# Patient Record
Sex: FEMALE | Race: WHITE | Employment: OTHER
[De-identification: names, ages, dates, MRNs, and addresses within clinical notes are randomized per-mention and may not be internally consistent; named-entity substitution may affect disease eponyms.]

---

## 2024-09-24 ENCOUNTER — OFFICE VISIT (OUTPATIENT)
Dept: PRIMARY CARE CLINIC | Facility: CLINIC | Age: 74
End: 2024-09-24
Payer: MEDICARE

## 2024-09-24 VITALS
HEART RATE: 76 BPM | BODY MASS INDEX: 20.66 KG/M2 | DIASTOLIC BLOOD PRESSURE: 84 MMHG | WEIGHT: 121 LBS | HEIGHT: 64 IN | SYSTOLIC BLOOD PRESSURE: 118 MMHG | TEMPERATURE: 97.8 F | OXYGEN SATURATION: 98 %

## 2024-09-24 DIAGNOSIS — Z76.89 ENCOUNTER TO ESTABLISH CARE WITH NEW DOCTOR: Primary | ICD-10-CM

## 2024-09-24 DIAGNOSIS — E78.89 ELEVATED HDL: ICD-10-CM

## 2024-09-24 DIAGNOSIS — Z12.31 SCREENING MAMMOGRAM FOR BREAST CANCER: ICD-10-CM

## 2024-09-24 DIAGNOSIS — M85.851 OSTEOPENIA OF NECKS OF BOTH FEMURS: ICD-10-CM

## 2024-09-24 DIAGNOSIS — M85.852 OSTEOPENIA OF NECKS OF BOTH FEMURS: ICD-10-CM

## 2024-09-24 PROCEDURE — 1036F TOBACCO NON-USER: CPT | Performed by: FAMILY MEDICINE

## 2024-09-24 PROCEDURE — G8420 CALC BMI NORM PARAMETERS: HCPCS | Performed by: FAMILY MEDICINE

## 2024-09-24 PROCEDURE — 1090F PRES/ABSN URINE INCON ASSESS: CPT | Performed by: FAMILY MEDICINE

## 2024-09-24 PROCEDURE — G8427 DOCREV CUR MEDS BY ELIG CLIN: HCPCS | Performed by: FAMILY MEDICINE

## 2024-09-24 PROCEDURE — 99203 OFFICE O/P NEW LOW 30 MIN: CPT | Performed by: FAMILY MEDICINE

## 2024-09-24 PROCEDURE — 3017F COLORECTAL CA SCREEN DOC REV: CPT | Performed by: FAMILY MEDICINE

## 2024-09-24 PROCEDURE — G8400 PT W/DXA NO RESULTS DOC: HCPCS | Performed by: FAMILY MEDICINE

## 2024-09-24 PROCEDURE — 1123F ACP DISCUSS/DSCN MKR DOCD: CPT | Performed by: FAMILY MEDICINE

## 2024-09-24 SDOH — ECONOMIC STABILITY: FOOD INSECURITY: WITHIN THE PAST 12 MONTHS, THE FOOD YOU BOUGHT JUST DIDN'T LAST AND YOU DIDN'T HAVE MONEY TO GET MORE.: NEVER TRUE

## 2024-09-24 SDOH — ECONOMIC STABILITY: INCOME INSECURITY: HOW HARD IS IT FOR YOU TO PAY FOR THE VERY BASICS LIKE FOOD, HOUSING, MEDICAL CARE, AND HEATING?: NOT HARD AT ALL

## 2024-09-24 SDOH — ECONOMIC STABILITY: FOOD INSECURITY: WITHIN THE PAST 12 MONTHS, YOU WORRIED THAT YOUR FOOD WOULD RUN OUT BEFORE YOU GOT MONEY TO BUY MORE.: NEVER TRUE

## 2024-09-24 ASSESSMENT — PATIENT HEALTH QUESTIONNAIRE - PHQ9
2. FEELING DOWN, DEPRESSED OR HOPELESS: NOT AT ALL
SUM OF ALL RESPONSES TO PHQ QUESTIONS 1-9: 0
SUM OF ALL RESPONSES TO PHQ9 QUESTIONS 1 & 2: 0
SUM OF ALL RESPONSES TO PHQ QUESTIONS 1-9: 0
1. LITTLE INTEREST OR PLEASURE IN DOING THINGS: NOT AT ALL

## 2024-09-24 ASSESSMENT — ENCOUNTER SYMPTOMS
VOMITING: 0
DIARRHEA: 0
ABDOMINAL PAIN: 0
SHORTNESS OF BREATH: 0
NAUSEA: 0
COUGH: 0

## 2024-10-01 ENCOUNTER — HOSPITAL ENCOUNTER (OUTPATIENT)
Dept: MAMMOGRAPHY | Age: 74
Discharge: HOME | End: 2024-10-04
Attending: FAMILY MEDICINE
Payer: MEDICARE

## 2024-10-01 DIAGNOSIS — Z12.31 SCREENING MAMMOGRAM FOR BREAST CANCER: ICD-10-CM

## 2024-10-01 PROCEDURE — 77067 SCR MAMMO BI INCL CAD: CPT

## 2024-10-15 ENCOUNTER — TELEPHONE (OUTPATIENT)
Dept: PRIMARY CARE CLINIC | Facility: CLINIC | Age: 74
End: 2024-10-15

## 2024-10-15 NOTE — TELEPHONE ENCOUNTER
Patient called to make acute appointment due to ongoing reaction from 9/24 appointment vaccines and additional vaccines of COVID and RSV.

## 2024-10-16 ENCOUNTER — OFFICE VISIT (OUTPATIENT)
Dept: PRIMARY CARE CLINIC | Facility: CLINIC | Age: 74
End: 2024-10-16

## 2024-10-16 VITALS
BODY MASS INDEX: 20.35 KG/M2 | DIASTOLIC BLOOD PRESSURE: 80 MMHG | HEART RATE: 70 BPM | WEIGHT: 119.2 LBS | TEMPERATURE: 97.5 F | HEIGHT: 64 IN | SYSTOLIC BLOOD PRESSURE: 118 MMHG | OXYGEN SATURATION: 90 %

## 2024-10-16 DIAGNOSIS — J20.9 ACUTE BRONCHITIS, UNSPECIFIED ORGANISM: Primary | ICD-10-CM

## 2024-10-16 LAB
EXP DATE SOLUTION: NORMAL
EXP DATE SWAB: NORMAL
EXPIRATION DATE: NORMAL
LOT NUMBER POC: NORMAL
LOT NUMBER SOLUTION: NORMAL
LOT NUMBER SWAB: NORMAL
QUICKVUE INFLUENZA TEST: NORMAL
RSV RNA, POC: NEGATIVE
SARS-COV-2 RNA, POC: NEGATIVE
VALID INTERNAL CONTROL, POC: NORMAL
VALID INTERNAL CONTROL, POC: YES

## 2024-10-16 RX ORDER — BENZONATATE 200 MG/1
200 CAPSULE ORAL 3 TIMES DAILY PRN
Qty: 30 CAPSULE | Refills: 0 | Status: SHIPPED | OUTPATIENT
Start: 2024-10-16 | End: 2024-10-23

## 2024-10-16 RX ORDER — AZITHROMYCIN 250 MG/1
TABLET, FILM COATED ORAL
Qty: 6 TABLET | Refills: 0 | Status: SHIPPED | OUTPATIENT
Start: 2024-10-16 | End: 2024-10-26

## 2024-10-16 ASSESSMENT — ENCOUNTER SYMPTOMS
NAUSEA: 0
RHINORRHEA: 1
WHEEZING: 1
ABDOMINAL PAIN: 0
VOMITING: 0
SHORTNESS OF BREATH: 0
COUGH: 1
DIARRHEA: 0

## 2024-10-16 NOTE — PROGRESS NOTES
[facility]  Viridiana Barbosa, DO  [address], [address]  [place] [id number] [phone]         ASSESSMENT AND PLAN    Problem List Items Addressed This Visit          Respiratory    Acute bronchitis - Primary      New issue for the last three weeks after getting Flu/COVID/RSV vaccines.  Negative tests in the office.  Will have her stop Prednisone from urgent care due to feeling jittery, will continue Mucinex, Flonase and Benzonatate, and will add Azithromycin.  Encouraged rest and staying hydrated.         Relevant Orders    AMB POC RAPID INFLUENZA TEST (Completed)    AMB POC COVID-19 COV (Completed)    AMB POC RSV (Completed)        The diagnoses and plan were discussed with the patient, who verbalizes understanding and agrees with plan.  All questions answered.    Chief Complaint    Chief Complaint   Patient presents with    Allergic Reaction     States that she was here 2 weeks ago. Got 3 covid shots and RSV vaccine Wednesday 10/2/24. States that she got sick on Thursday.     Cough     States that its productive with green phlegm.        HISTORY OF PRESENT ILLNESS    74 y.o. female presents today for cough and congestion.  Last seen three weeks ago to establish care.  After that visit she went to the pharmacy the next day and got COVID, flu and RSV vaccines.  States that 24 hours later she developed cough and congestion.  Has had persistent symptoms for the last 3 weeks.  States that she went to urgent care and they prescribed her Prednisone and Benzonatate.  Reports that she had fever, reports normal temperature is 96.6 and her temperature went up to 98 degrees.  Did not do any swabs and wrote her prescriptions.  States that she took the prednisone which made her \"wiggy\" so didn't take any more after the first dose.  Using Flonase, Mucinex and Benzonatate.  States that now she is coughing up slimy yellow mucus.  Denies vomiting or diarrhea.  Has had some

## 2024-10-16 NOTE — ASSESSMENT & PLAN NOTE
New issue for the last three weeks after getting Flu/COVID/RSV vaccines.  Negative tests in the office.  Will have her stop Prednisone from urgent care due to feeling jittery, will continue Mucinex, Flonase and Benzonatate, and will add Azithromycin.  Encouraged rest and staying hydrated.

## 2024-10-16 NOTE — PATIENT INSTRUCTIONS
IT WAS GREAT TO SEE YOU TODAY!    I WILL CONTACT YOU WITH THE RESULTS OF YOUR LABS.    PLEASE TAKE ALL MEDICATION AS DISCUSSED.    ~TAKE THE BENZONATATE TO HELP WITH COUGH.    ~CONTINUE THE FLONASE NOSE SPRAY, REMEMBER TO AIM BACKWARDS IN YOUR NOSTRIL.    ~CONTINUE THE MUCUS PILLS.    ~DRINK LOTS OF WATER.    ~TAKE THE AZITHROMYCIN ONCE A DAY WITH FOOD FOR FIVE DAYS.    I WILL SEE YOU AGAIN IN 5 MONTHS BUT PLEASE CALL WITH CONCERNS [phone]

## 2024-10-29 ENCOUNTER — OFFICE VISIT (OUTPATIENT)
Dept: PRIMARY CARE CLINIC | Facility: CLINIC | Age: 74
End: 2024-10-29

## 2024-10-29 VITALS
HEIGHT: 64 IN | BODY MASS INDEX: 20.11 KG/M2 | DIASTOLIC BLOOD PRESSURE: 84 MMHG | OXYGEN SATURATION: 98 % | HEART RATE: 81 BPM | WEIGHT: 117.8 LBS | SYSTOLIC BLOOD PRESSURE: 120 MMHG

## 2024-10-29 DIAGNOSIS — G93.31 POSTVIRAL FATIGUE SYNDROME: ICD-10-CM

## 2024-10-29 DIAGNOSIS — R63.4 WEIGHT LOSS, UNINTENTIONAL: ICD-10-CM

## 2024-10-29 DIAGNOSIS — J20.8 ACUTE BRONCHITIS DUE TO OTHER SPECIFIED ORGANISMS: Primary | ICD-10-CM

## 2024-10-29 LAB
ALBUMIN SERPL-MCNC: 3.8 G/DL (ref 3.2–4.6)
ALBUMIN/GLOB SERPL: 1.4 (ref 1–1.9)
ALP SERPL-CCNC: 93 U/L (ref 35–104)
ALT SERPL-CCNC: 17 U/L (ref 8–45)
ANION GAP SERPL CALC-SCNC: 12 MMOL/L (ref 7–16)
AST SERPL-CCNC: 21 U/L (ref 15–37)
BASOPHILS # BLD: 0.1 K/UL (ref 0–0.2)
BASOPHILS NFR BLD: 1 % (ref 0–2)
BILIRUB SERPL-MCNC: 0.5 MG/DL (ref 0–1.2)
BUN SERPL-MCNC: 17 MG/DL (ref 8–23)
CALCIUM SERPL-MCNC: 9.4 MG/DL (ref 8.8–10.2)
CHLORIDE SERPL-SCNC: 102 MMOL/L (ref 98–107)
CO2 SERPL-SCNC: 25 MMOL/L (ref 20–29)
CREAT SERPL-MCNC: 0.56 MG/DL (ref 0.6–1.1)
DIFFERENTIAL METHOD BLD: ABNORMAL
EOSINOPHIL # BLD: 0.2 K/UL (ref 0–0.8)
EOSINOPHIL NFR BLD: 3 % (ref 0.5–7.8)
ERYTHROCYTE [DISTWIDTH] IN BLOOD BY AUTOMATED COUNT: 14.8 % (ref 11.9–14.6)
GLOBULIN SER CALC-MCNC: 2.7 G/DL (ref 2.3–3.5)
GLUCOSE SERPL-MCNC: 85 MG/DL (ref 70–99)
HCT VFR BLD AUTO: 41.7 % (ref 35.8–46.3)
HGB BLD-MCNC: 12.8 G/DL (ref 11.7–15.4)
IMM GRANULOCYTES # BLD AUTO: 0 K/UL (ref 0–0.5)
IMM GRANULOCYTES NFR BLD AUTO: 0 % (ref 0–5)
LYMPHOCYTES # BLD: 1.9 K/UL (ref 0.5–4.6)
LYMPHOCYTES NFR BLD: 31 % (ref 13–44)
MCH RBC QN AUTO: 26.7 PG (ref 26.1–32.9)
MCHC RBC AUTO-ENTMCNC: 30.7 G/DL (ref 31.4–35)
MCV RBC AUTO: 86.9 FL (ref 82–102)
MONOCYTES # BLD: 0.6 K/UL (ref 0.1–1.3)
MONOCYTES NFR BLD: 9 % (ref 4–12)
NEUTS SEG # BLD: 3.5 K/UL (ref 1.7–8.2)
NEUTS SEG NFR BLD: 56 % (ref 43–78)
NRBC # BLD: 0 K/UL (ref 0–0.2)
PLATELET # BLD AUTO: 345 K/UL (ref 150–450)
PMV BLD AUTO: 9.8 FL (ref 9.4–12.3)
POTASSIUM SERPL-SCNC: 4.4 MMOL/L (ref 3.5–5.1)
PROT SERPL-MCNC: 6.5 G/DL (ref 6.3–8.2)
RBC # BLD AUTO: 4.8 M/UL (ref 4.05–5.2)
SODIUM SERPL-SCNC: 138 MMOL/L (ref 136–145)
TSH W FREE THYROID IF ABNORMAL: 2.72 UIU/ML (ref 0.27–4.2)
WBC # BLD AUTO: 6.1 K/UL (ref 4.3–11.1)

## 2024-10-29 RX ORDER — ALBUTEROL SULFATE 90 UG/1
2 INHALANT RESPIRATORY (INHALATION) 4 TIMES DAILY PRN
Qty: 18 G | Refills: 0 | Status: SHIPPED | OUTPATIENT
Start: 2024-10-29

## 2024-10-29 ASSESSMENT — ENCOUNTER SYMPTOMS
VOICE CHANGE: 1
RHINORRHEA: 1
WHEEZING: 1
VOMITING: 0
CHEST TIGHTNESS: 0
NAUSEA: 1
EYES NEGATIVE: 1
BLOOD IN STOOL: 0
ALLERGIC/IMMUNOLOGIC NEGATIVE: 1
ABDOMINAL PAIN: 0
COUGH: 1
SHORTNESS OF BREATH: 1
DIARRHEA: 0

## 2024-10-29 NOTE — ASSESSMENT & PLAN NOTE
Continued bronchitis, with first symptoms onset 9/26 after getting RSV, COVID, & flu vaccines 9/25  - rales on exam, endorses wheezing. Albuterol inhaler prn.   - clinical picture low suspicion for pneumonia but with persistent symptoms, cxr  - labwork requested & agree with : tsh w/ reflex, cmp w/ reflex, cbc w/ diff  - continue flonase, otc cough drops, & benzonatate  - mucinex advised, but doesn't like to take medication  - routine f/u 3/26/2025 or sooner for acute care or if symptoms worsen

## 2024-10-29 NOTE — PATIENT INSTRUCTIONS
IT WAS GREAT TO MEET YOU TODAY!    WE WILL CONTACT YOU WITH THE RESULTS OF YOUR LABS AND IMAGING.    PLEASE TAKE ALL MEDICATION AS DISCUSSED.   - 2 SPRAYS OF ALBUTEROL UP TO 4 TIMES A DAY AS NEEDED FOR WHEEZING OR SHORTNESS OF BREATH  - CONTINUE FLONASE 2 SPRAYS EACH NOSTRIL DAILY  - CONTINUE OVER THE COUNTER COUGH DROPS (E.G. RICOLA SUGAR FREE COUGH DROPS) & PRESCRIBED BENZONATATE COUGH DROPS FOR COUGH      PLEASE LET US KNOW, IF SYMPTOMS WORSEN. IF SYMPTOMS ARE STILL NOT RESOLVED IN 1 WEEK, PLEASE LET US KNOW.     DRINK LOTS OF WATER. WEAR YOUR SEATBELT. CONTINUE TO FORCE SELF TO EAT.     WE WILL SEE YOU AGAIN AT YOUR NEXT APPOINTMENT WITH DR. CROOK 3/26/2025 BUT PLEASE SEND [employer] MESSAGE OR CALL WITH CONCERNS [phone]

## 2024-10-30 ENCOUNTER — HOSPITAL ENCOUNTER (OUTPATIENT)
Dept: GENERAL RADIOLOGY | Age: 74
Discharge: HOME | End: 2024-11-02
Payer: MEDICARE

## 2024-10-30 DIAGNOSIS — G93.31 POSTVIRAL FATIGUE SYNDROME: ICD-10-CM

## 2024-10-30 DIAGNOSIS — J20.8 ACUTE BRONCHITIS DUE TO OTHER SPECIFIED ORGANISMS: ICD-10-CM

## 2024-10-30 PROCEDURE — 71046 X-RAY EXAM CHEST 2 VIEWS: CPT

## 2024-12-20 ENCOUNTER — TELEPHONE (OUTPATIENT)
Dept: PRIMARY CARE CLINIC | Facility: CLINIC | Age: 74
End: 2024-12-20

## 2024-12-20 DIAGNOSIS — J34.9 SINUS DISORDER: Primary | ICD-10-CM

## 2024-12-20 NOTE — TELEPHONE ENCOUNTER
Patient states her Dentist said she had a dropped sinus which is causing a blockage to her right ear. Has also gone to an audiologist and they said there is an issue with her right ear.     Is wondering if she has to come in and see Dr. Barbosa or if she can get a referral to ENT.

## 2024-12-23 NOTE — TELEPHONE ENCOUNTER
Called patient and let her know ENT referral has been sent and she can reach out to us if she has not heard from them about scheduling an appointment in the next couple of weeks.

## 2025-01-20 ENCOUNTER — OFFICE VISIT (OUTPATIENT)
Dept: ENT CLINIC | Age: 75
End: 2025-01-20
Payer: MEDICARE

## 2025-01-20 VITALS — DIASTOLIC BLOOD PRESSURE: 64 MMHG | WEIGHT: 120 LBS | BODY MASS INDEX: 20.6 KG/M2 | SYSTOLIC BLOOD PRESSURE: 122 MMHG

## 2025-01-20 DIAGNOSIS — H93.13 TINNITUS AURIUM, BILATERAL: ICD-10-CM

## 2025-01-20 DIAGNOSIS — M26.621 ARTHRALGIA OF RIGHT TEMPOROMANDIBULAR JOINT: ICD-10-CM

## 2025-01-20 DIAGNOSIS — H69.90 EUSTACHIAN TUBE DYSFUNCTION, UNSPECIFIED LATERALITY: ICD-10-CM

## 2025-01-20 DIAGNOSIS — H93.291 ABNORMAL AUDITORY PERCEPTION OF RIGHT EAR: Primary | ICD-10-CM

## 2025-01-20 DIAGNOSIS — J34.2 NASAL SEPTAL DEVIATION: ICD-10-CM

## 2025-01-20 PROCEDURE — 1159F MED LIST DOCD IN RCRD: CPT | Performed by: OTOLARYNGOLOGY

## 2025-01-20 PROCEDURE — 1123F ACP DISCUSS/DSCN MKR DOCD: CPT | Performed by: OTOLARYNGOLOGY

## 2025-01-20 PROCEDURE — 99204 OFFICE O/P NEW MOD 45 MIN: CPT | Performed by: OTOLARYNGOLOGY

## 2025-01-20 PROCEDURE — 31231 NASAL ENDOSCOPY DX: CPT | Performed by: OTOLARYNGOLOGY

## 2025-01-20 RX ORDER — CHOLECALCIFEROL (VITAMIN D3) 1250 MCG
CAPSULE ORAL
COMMUNITY

## 2025-01-20 RX ORDER — MULTIVIT-MIN/FERROUS GLUCONATE 9 MG/15 ML
15 LIQUID (ML) ORAL DAILY
COMMUNITY

## 2025-01-20 RX ORDER — COVID-19 ANTIGEN TEST
KIT MISCELLANEOUS
COMMUNITY

## 2025-01-20 RX ORDER — NICOTINE POLACRILEX 2 MG
GUM BUCCAL
COMMUNITY

## 2025-01-20 NOTE — PROGRESS NOTES
will be described below.     MOUTH/PHARYNX/LARYNX:   Assessment of the lips, gums, hard/soft palate, tongue, tonsillar fossae and oropharynx for mass, lesions or mucosal abnormalities was performed.   The base of tongue and floor of mouth were inspected for lesions and palpated for mass or nodularity.   Mirror exam of the larynx to assess for vocal fold mobility and any gross mass or lesion was performed. Mirror exam of the nasopharynx was attempted, to assess for gross mass or lesion of the nasopharynx or any of adenoidal hyperplasia or inflammation.   Any abnormalities requiring further examination by flexible endoscopy will be described below.     NECK:   Gross inspection of the neck was performed to assess for mass or asymmetry.  Palpation of the level I-IV lymph nodes was performed to assess for any grossly enlarged, or abnormally firm lymphadenopathy.   The skin of the neck was examined for any induration or swelling and palpated for any crepitus.   The larynx and trachea were palpated to assess position in the neck and continuity.   The thyroid was palpated to assess for any mass, nodularity or asymmetry.     NEURO/PSYCH:   Cranial nerves II-XII were grossly assessed for any weakness or asymmetry.   If indicated, CN I was assessed by administration of a standardized smell test (UPSIT).  Orientation to person, place and time was assessed.   Mood and affect were assessed.    RESPIRATION:   Respiratory effort was assessed for increased work of breathing and inspiratory or expiratory wheezing.   Chest expansion was noted for symmetry.     CARDIOVASCULAR:   Gross examination for peripheral vascular edema and jugular venous distension was performed.        PERTINENT PHYSICAL EXAM FINDINGS - examination for above was grossly within normal limits with exceptions listed below:    On anterior rhinoscopy there is a severe left-sided septal deviation.  Due to limitations of anterior rhinoscopy, nasal endoscopy was

## 2025-03-10 ENCOUNTER — PATIENT MESSAGE (OUTPATIENT)
Dept: PRIMARY CARE CLINIC | Facility: CLINIC | Age: 75
End: 2025-03-10

## 2025-03-10 DIAGNOSIS — G89.29 CHRONIC PAIN OF BOTH KNEES: Primary | ICD-10-CM

## 2025-03-10 DIAGNOSIS — M25.562 CHRONIC PAIN OF BOTH KNEES: Primary | ICD-10-CM

## 2025-03-10 DIAGNOSIS — M25.561 CHRONIC PAIN OF BOTH KNEES: Primary | ICD-10-CM

## 2025-03-12 ENCOUNTER — OFFICE VISIT (OUTPATIENT)
Dept: ORTHOPEDIC SURGERY | Age: 75
End: 2025-03-12

## 2025-03-12 DIAGNOSIS — M17.12 PRIMARY OSTEOARTHRITIS OF LEFT KNEE: ICD-10-CM

## 2025-03-12 DIAGNOSIS — M25.561 PAIN IN BOTH KNEES, UNSPECIFIED CHRONICITY: Primary | ICD-10-CM

## 2025-03-12 DIAGNOSIS — M17.11 PRIMARY OSTEOARTHRITIS OF RIGHT KNEE: ICD-10-CM

## 2025-03-12 DIAGNOSIS — M25.562 PAIN IN BOTH KNEES, UNSPECIFIED CHRONICITY: Primary | ICD-10-CM

## 2025-03-12 RX ORDER — METHYLPREDNISOLONE ACETATE 40 MG/ML
40 INJECTION, SUSPENSION INTRA-ARTICULAR; INTRALESIONAL; INTRAMUSCULAR; SOFT TISSUE ONCE
Status: COMPLETED | OUTPATIENT
Start: 2025-03-12 | End: 2025-03-12

## 2025-03-12 RX ADMIN — METHYLPREDNISOLONE ACETATE 40 MG: 40 INJECTION, SUSPENSION INTRA-ARTICULAR; INTRALESIONAL; INTRAMUSCULAR; SOFT TISSUE at 09:59
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
## 2025-03-25 SDOH — HEALTH STABILITY: PHYSICAL HEALTH: ON AVERAGE, HOW MANY MINUTES DO YOU ENGAGE IN EXERCISE AT THIS LEVEL?: 70 MIN

## 2025-03-25 SDOH — ECONOMIC STABILITY: INCOME INSECURITY: IN THE LAST 12 MONTHS, WAS THERE A TIME WHEN YOU WERE NOT ABLE TO PAY THE MORTGAGE OR RENT ON TIME?: NO

## 2025-03-25 SDOH — ECONOMIC STABILITY: TRANSPORTATION INSECURITY
IN THE PAST 12 MONTHS, HAS LACK OF TRANSPORTATION KEPT YOU FROM MEETINGS, WORK, OR FROM GETTING THINGS NEEDED FOR DAILY LIVING?: NO

## 2025-03-25 SDOH — HEALTH STABILITY: PHYSICAL HEALTH: ON AVERAGE, HOW MANY DAYS PER WEEK DO YOU ENGAGE IN MODERATE TO STRENUOUS EXERCISE (LIKE A BRISK WALK)?: 7 DAYS

## 2025-03-25 SDOH — ECONOMIC STABILITY: FOOD INSECURITY: WITHIN THE PAST 12 MONTHS, THE FOOD YOU BOUGHT JUST DIDN'T LAST AND YOU DIDN'T HAVE MONEY TO GET MORE.: NEVER TRUE

## 2025-03-25 SDOH — ECONOMIC STABILITY: FOOD INSECURITY: WITHIN THE PAST 12 MONTHS, YOU WORRIED THAT YOUR FOOD WOULD RUN OUT BEFORE YOU GOT MONEY TO BUY MORE.: NEVER TRUE

## 2025-03-25 SDOH — ECONOMIC STABILITY: TRANSPORTATION INSECURITY
IN THE PAST 12 MONTHS, HAS THE LACK OF TRANSPORTATION KEPT YOU FROM MEDICAL APPOINTMENTS OR FROM GETTING MEDICATIONS?: NO

## 2025-03-25 ASSESSMENT — LIFESTYLE VARIABLES
HOW OFTEN DURING THE LAST YEAR HAVE YOU FAILED TO DO WHAT WAS NORMALLY EXPECTED FROM YOU BECAUSE OF DRINKING: NEVER
HAVE YOU OR SOMEONE ELSE BEEN INJURED AS A RESULT OF YOUR DRINKING: NO
HOW OFTEN DURING THE LAST YEAR HAVE YOU FOUND THAT YOU WERE NOT ABLE TO STOP DRINKING ONCE YOU HAD STARTED: NEVER
HOW OFTEN DURING THE LAST YEAR HAVE YOU HAD A FEELING OF GUILT OR REMORSE AFTER DRINKING: NEVER
HOW OFTEN DURING THE LAST YEAR HAVE YOU NEEDED AN ALCOHOLIC DRINK FIRST THING IN THE MORNING TO GET YOURSELF GOING AFTER A NIGHT OF HEAVY DRINKING: NEVER
HOW OFTEN DURING THE LAST YEAR HAVE YOU FOUND THAT YOU WERE NOT ABLE TO STOP DRINKING ONCE YOU HAD STARTED: NEVER
HOW OFTEN DURING THE LAST YEAR HAVE YOU FAILED TO DO WHAT WAS NORMALLY EXPECTED FROM YOU BECAUSE OF DRINKING: NEVER
HOW MANY STANDARD DRINKS CONTAINING ALCOHOL DO YOU HAVE ON A TYPICAL DAY: 1
HAS A RELATIVE, FRIEND, DOCTOR, OR ANOTHER HEALTH PROFESSIONAL EXPRESSED CONCERN ABOUT YOUR DRINKING OR SUGGESTED YOU CUT DOWN: NO
HAS A RELATIVE, FRIEND, DOCTOR, OR ANOTHER HEALTH PROFESSIONAL EXPRESSED CONCERN ABOUT YOUR DRINKING OR SUGGESTED YOU CUT DOWN: NO
HOW OFTEN DO YOU HAVE A DRINK CONTAINING ALCOHOL: 5
HOW OFTEN DURING THE LAST YEAR HAVE YOU BEEN UNABLE TO REMEMBER WHAT HAPPENED THE NIGHT BEFORE BECAUSE YOU HAD BEEN DRINKING: NEVER
HOW OFTEN DO YOU HAVE SIX OR MORE DRINKS ON ONE OCCASION: 1
HOW OFTEN DURING THE LAST YEAR HAVE YOU BEEN UNABLE TO REMEMBER WHAT HAPPENED THE NIGHT BEFORE BECAUSE YOU HAD BEEN DRINKING: NEVER
HAVE YOU OR SOMEONE ELSE BEEN INJURED AS A RESULT OF YOUR DRINKING: NO
HOW OFTEN DURING THE LAST YEAR HAVE YOU NEEDED AN ALCOHOLIC DRINK FIRST THING IN THE MORNING TO GET YOURSELF GOING AFTER A NIGHT OF HEAVY DRINKING: NEVER
HOW OFTEN DO YOU HAVE A DRINK CONTAINING ALCOHOL: 4 OR MORE TIMES A WEEK
HOW MANY STANDARD DRINKS CONTAINING ALCOHOL DO YOU HAVE ON A TYPICAL DAY: 1 OR 2
HOW OFTEN DURING THE LAST YEAR HAVE YOU HAD A FEELING OF GUILT OR REMORSE AFTER DRINKING: NEVER

## 2025-03-25 ASSESSMENT — PATIENT HEALTH QUESTIONNAIRE - PHQ9
2. FEELING DOWN, DEPRESSED OR HOPELESS: NOT AT ALL
SUM OF ALL RESPONSES TO PHQ QUESTIONS 1-9: 0
SUM OF ALL RESPONSES TO PHQ QUESTIONS 1-9: 0
1. LITTLE INTEREST OR PLEASURE IN DOING THINGS: NOT AT ALL
SUM OF ALL RESPONSES TO PHQ QUESTIONS 1-9: 0
SUM OF ALL RESPONSES TO PHQ QUESTIONS 1-9: 0

## 2025-03-26 ENCOUNTER — OFFICE VISIT (OUTPATIENT)
Dept: PRIMARY CARE CLINIC | Facility: CLINIC | Age: 75
End: 2025-03-26
Payer: MEDICARE

## 2025-03-26 VITALS
OXYGEN SATURATION: 99 % | TEMPERATURE: 97.8 F | SYSTOLIC BLOOD PRESSURE: 138 MMHG | DIASTOLIC BLOOD PRESSURE: 80 MMHG | BODY MASS INDEX: 21.07 KG/M2 | WEIGHT: 123.4 LBS | HEART RATE: 70 BPM | HEIGHT: 64 IN

## 2025-03-26 DIAGNOSIS — E78.89 ELEVATED HDL: ICD-10-CM

## 2025-03-26 DIAGNOSIS — M85.851 OSTEOPENIA OF NECKS OF BOTH FEMURS: ICD-10-CM

## 2025-03-26 DIAGNOSIS — Z00.00 MEDICARE ANNUAL WELLNESS VISIT, SUBSEQUENT: Primary | ICD-10-CM

## 2025-03-26 DIAGNOSIS — M85.852 OSTEOPENIA OF NECKS OF BOTH FEMURS: ICD-10-CM

## 2025-03-26 PROCEDURE — G0439 PPPS, SUBSEQ VISIT: HCPCS | Performed by: FAMILY MEDICINE

## 2025-03-26 PROCEDURE — 1123F ACP DISCUSS/DSCN MKR DOCD: CPT | Performed by: FAMILY MEDICINE

## 2025-03-26 PROCEDURE — 1159F MED LIST DOCD IN RCRD: CPT | Performed by: FAMILY MEDICINE

## 2025-03-26 ASSESSMENT — ENCOUNTER SYMPTOMS
DIARRHEA: 0
COUGH: 0
VOMITING: 0
NAUSEA: 0
ABDOMINAL PAIN: 0
SHORTNESS OF BREATH: 0

## 2025-03-26 NOTE — PROGRESS NOTES
Medicare Annual Wellness Visit    Nuha Scott is here for Medicare AWV (Mammogram due October/Said she had someone from [facility] come to her house and do a colonoscopy, no results available)    Assessment & Plan   Medicare annual wellness visit, subsequent  Osteopenia of necks of both femurs  Assessment & Plan:   Chronic issue, previously discussed taking calcium and vitamin D supplements.  Will check vitamin D level when she returns for labs.  Orders:  -     Lipid Panel; Future  -     Comprehensive Metabolic Panel; Future  -     CBC with Auto Differential; Future  -     Vitamin D 25 Hydroxy; Future  Elevated HDL  Assessment & Plan:   Chronic, likely due to healthy lifestyle.  Will have her return for fasting cholesterol panel.  Orders:  -     Lipid Panel; Future  -     Comprehensive Metabolic Panel; Future  -     CBC with Auto Differential; Future     Return in 6 months (on 9/26/2025).     Subjective     76 yo female presents for Medicare AWV and follow up.  States that [facility] sent someone to her house to get a Medicare visit at home.  Last seen by me for bronchitis five months ago.  Has seen Elizabeth Wang, for chronic knee pain.  Given a steroid injection in her right knee.  Notes that her knee feels better and she is going hiking tomorrow.  Has also seen ENT for R TMJ and told to return for audiogram for her tinnitus.  Denies any problems right now, using her hearing aids.  No other concerns today.    Patient's complete Health Risk Assessment and screening values have been reviewed and are found in Flowsheets. The following problems were reviewed today and where indicated follow up appointments were made and/or referrals ordered.    No Positive Risk Factors identified today.      Review of Systems   Constitutional:  Negative for fever.   HENT:  Negative for congestion.    Respiratory:  Negative for cough and shortness of breath.    Cardiovascular:  Negative for chest pain.   Gastrointestinal:  Negative for

## 2025-03-26 NOTE — ASSESSMENT & PLAN NOTE
Chronic issue, previously discussed taking calcium and vitamin D supplements.  Will check vitamin D level when she returns for labs.

## 2025-04-08 ENCOUNTER — OFFICE VISIT (OUTPATIENT)
Dept: PRIMARY CARE CLINIC | Facility: CLINIC | Age: 75
End: 2025-04-08
Payer: MEDICARE

## 2025-04-08 ENCOUNTER — CLINICAL SUPPORT (OUTPATIENT)
Dept: PRIMARY CARE CLINIC | Facility: CLINIC | Age: 75
End: 2025-04-08

## 2025-04-08 VITALS
HEART RATE: 74 BPM | DIASTOLIC BLOOD PRESSURE: 80 MMHG | WEIGHT: 122.2 LBS | OXYGEN SATURATION: 98 % | BODY MASS INDEX: 20.86 KG/M2 | SYSTOLIC BLOOD PRESSURE: 140 MMHG | HEIGHT: 64 IN

## 2025-04-08 DIAGNOSIS — E78.89 ELEVATED HDL: ICD-10-CM

## 2025-04-08 DIAGNOSIS — Z23 NEED FOR PNEUMOCOCCAL VACCINE: Primary | ICD-10-CM

## 2025-04-08 DIAGNOSIS — N30.01 ACUTE CYSTITIS WITH HEMATURIA: ICD-10-CM

## 2025-04-08 DIAGNOSIS — Z23 NEED FOR TDAP VACCINATION: ICD-10-CM

## 2025-04-08 DIAGNOSIS — M85.851 OSTEOPENIA OF NECKS OF BOTH FEMURS: ICD-10-CM

## 2025-04-08 DIAGNOSIS — N30.01 ACUTE CYSTITIS WITH HEMATURIA: Primary | ICD-10-CM

## 2025-04-08 DIAGNOSIS — M85.852 OSTEOPENIA OF NECKS OF BOTH FEMURS: ICD-10-CM

## 2025-04-08 LAB
25(OH)D3 SERPL-MCNC: 57 NG/ML (ref 30–100)
ALBUMIN SERPL-MCNC: 3.9 G/DL (ref 3.2–4.6)
ALBUMIN/GLOB SERPL: 1.3 (ref 1–1.9)
ALP SERPL-CCNC: 92 U/L (ref 35–104)
ALT SERPL-CCNC: 37 U/L (ref 8–45)
ANION GAP SERPL CALC-SCNC: 13 MMOL/L (ref 7–16)
AST SERPL-CCNC: 87 U/L (ref 15–37)
BASOPHILS # BLD: 0.06 K/UL (ref 0–0.2)
BASOPHILS NFR BLD: 0.8 % (ref 0–2)
BILIRUB SERPL-MCNC: 0.8 MG/DL (ref 0–1.2)
BILIRUBIN, URINE, POC: NEGATIVE
BLOOD URINE, POC: ABNORMAL
BUN SERPL-MCNC: 23 MG/DL (ref 8–23)
CALCIUM SERPL-MCNC: 9.5 MG/DL (ref 8.8–10.2)
CHLORIDE SERPL-SCNC: 102 MMOL/L (ref 98–107)
CHOLEST SERPL-MCNC: 223 MG/DL (ref 0–200)
CO2 SERPL-SCNC: 26 MMOL/L (ref 20–29)
CREAT SERPL-MCNC: 0.81 MG/DL (ref 0.6–1.1)
DIFFERENTIAL METHOD BLD: NORMAL
EOSINOPHIL # BLD: 0.05 K/UL (ref 0–0.8)
EOSINOPHIL NFR BLD: 0.7 % (ref 0.5–7.8)
ERYTHROCYTE [DISTWIDTH] IN BLOOD BY AUTOMATED COUNT: 14.4 % (ref 11.9–14.6)
GLOBULIN SER CALC-MCNC: 3 G/DL (ref 2.3–3.5)
GLUCOSE SERPL-MCNC: 80 MG/DL (ref 70–99)
GLUCOSE URINE, POC: NEGATIVE
HCT VFR BLD AUTO: 45.2 % (ref 35.8–46.3)
HDLC SERPL-MCNC: 71 MG/DL (ref 40–60)
HDLC SERPL: 3.1 (ref 0–5)
HGB BLD-MCNC: 14.4 G/DL (ref 11.7–15.4)
IMM GRANULOCYTES # BLD AUTO: 0.02 K/UL (ref 0–0.5)
IMM GRANULOCYTES NFR BLD AUTO: 0.3 % (ref 0–5)
KETONES, URINE, POC: NEGATIVE
LDLC SERPL CALC-MCNC: 131 MG/DL (ref 0–100)
LEUKOCYTE ESTERASE, URINE, POC: ABNORMAL
LYMPHOCYTES # BLD: 1.91 K/UL (ref 0.5–4.6)
LYMPHOCYTES NFR BLD: 25.1 % (ref 13–44)
MCH RBC QN AUTO: 30.8 PG (ref 26.1–32.9)
MCHC RBC AUTO-ENTMCNC: 31.9 G/DL (ref 31.4–35)
MCV RBC AUTO: 96.6 FL (ref 82–102)
MONOCYTES # BLD: 0.77 K/UL (ref 0.1–1.3)
MONOCYTES NFR BLD: 10.1 % (ref 4–12)
NEUTS SEG # BLD: 4.79 K/UL (ref 1.7–8.2)
NEUTS SEG NFR BLD: 63 % (ref 43–78)
NITRITE, URINE, POC: NEGATIVE
NRBC # BLD: 0 K/UL (ref 0–0.2)
PH, URINE, POC: 6 (ref 4.6–8)
PLATELET # BLD AUTO: 306 K/UL (ref 150–450)
PMV BLD AUTO: 10.3 FL (ref 9.4–12.3)
POTASSIUM SERPL-SCNC: 4.4 MMOL/L (ref 3.5–5.1)
PROT SERPL-MCNC: 7 G/DL (ref 6.3–8.2)
PROTEIN,URINE, POC: 30
RBC # BLD AUTO: 4.68 M/UL (ref 4.05–5.2)
SODIUM SERPL-SCNC: 141 MMOL/L (ref 136–145)
SPECIFIC GRAVITY, URINE, POC: 1.02 (ref 1–1.03)
TRIGL SERPL-MCNC: 103 MG/DL (ref 0–150)
URINALYSIS CLARITY, POC: ABNORMAL
URINALYSIS COLOR, POC: ABNORMAL
UROBILINOGEN, POC: ABNORMAL
VLDLC SERPL CALC-MCNC: 21 MG/DL (ref 6–23)
WBC # BLD AUTO: 7.6 K/UL (ref 4.3–11.1)

## 2025-04-08 PROCEDURE — 1160F RVW MEDS BY RX/DR IN RCRD: CPT

## 2025-04-08 PROCEDURE — 99214 OFFICE O/P EST MOD 30 MIN: CPT

## 2025-04-08 PROCEDURE — 1159F MED LIST DOCD IN RCRD: CPT

## 2025-04-08 PROCEDURE — 1123F ACP DISCUSS/DSCN MKR DOCD: CPT

## 2025-04-08 PROCEDURE — 81003 URINALYSIS AUTO W/O SCOPE: CPT

## 2025-04-08 RX ORDER — SULFAMETHOXAZOLE AND TRIMETHOPRIM 800; 160 MG/1; MG/1
1 TABLET ORAL 2 TIMES DAILY
Qty: 6 TABLET | Refills: 0 | Status: SHIPPED | OUTPATIENT
Start: 2025-04-08 | End: 2025-04-11

## 2025-04-08 NOTE — PATIENT INSTRUCTIONS
IT WAS GREAT TO SEE YOU TODAY! I HOPE YOU FEEL BETTER SOON.     WE WILL CONTACT YOU WITH THE RESULTS OF YOUR URINE CULTURE.    PLEASE TAKE ALL MEDICATION AS DISCUSSED.   - BACTRIM ANTIBIOTIC TWICE DAILY FOR 3 DAYS. TAKE WITH FOOD, WATER, OVER THE COUNTER PROBIOTIC (E.G. ALIGN OR CULTURELLE), & TO COMPLETION. AVOID ALCOHOL WHILE SICK.   - CONTINUE OVER THE COUNTER AZO FOR SYMPTOM CONTROL AS NEEDED    DRINK LOTS OF WATER. WEAR YOUR SEATBELT. FOCUS ON FRESH FRUITS AND VEGGIES DAILY, AND LEAN MEATS LIKE CHICKEN OR FISH. AVOID FRIED, FATTY FOODS, BREAD, PASTA, SALT AND \"ADDED\" SUGAR, ESPECIALLY JUICES AND SODA. AVOID TOBACCO AND EXCESSIVE ALCOHOL. EXERCISE AT LEAST 5 TIMES A WEEK FOR AT LEAST 30 MINUTES AT A TIME. KEEP A CONSISTENT SLEEP SCHEDULE.     RETURN IF SYMPTOMS WORSEN OR DO NOT IMPROVE WITH TREATMENT.      WE WILL SEE YOU AGAIN AT YOUR NEXT APPOINTMENT WITH US BUT PLEASE SEND [employer] MESSAGE OR CALL WITH CONCERNS -[phone]

## 2025-04-08 NOTE — PROGRESS NOTES
Take by mouth, Disp: , Rfl:     Turmeric 5-1000 MG CAPS, Take by mouth, Disp: , Rfl:     ALLERGIES / INTOLERANCES    Allergies   Allergen Reactions    Codeine Other (See Comments)     Does not feel good        REVIEW OF SYSTEMS        ROS negative other than noted in HPI.     PHYSICAL EXAMINATION    Physical Exam  The patient is not in acute distress.  The head is normocephalic and atraumatic.  Lung sounds are clear, present, and equal bilaterally with no adventitious lung sounds.  The heart has a regular rate and rhythm. No murmurs, rubs, or gallops are detected.  There is no CVA tenderness in the kidneys.  The range of motion in the musculoskeletal system is normal.  The patient is alert and oriented x4 to person, place, time, and event per baseline. Gait is normal. Cranial nerves II through XII are grossly intact.  The skin is moist and dry.  Weight is normal.    Vitals:    04/08/25 1203   BP: (!) 140/80   Pulse: 74   SpO2: 98%   Weight: 55.4 kg (122 lb 3.2 oz)   Height: 1.626 m (5' 4\")            RESULTS    Recent Results (from the past 400 hours)   AMB POC URINALYSIS DIP STICK AUTO W/O MICRO    Collection Time: 04/08/25 11:50 AM   Result Value Ref Range    Color, Urine, POC Dark Yellow     Clarity, Urine, POC Cloudy     Glucose, Urine, POC Negative     Bilirubin, Urine, POC Negative     Ketones, Urine, POC Negative     Specific Gravity, Urine, POC 1.020 1.001 - 1.035    Blood, Urine, POC Trace-intact Negative    pH, Urine, POC 6.0 4.6 - 8.0    Protein, Urine, POC 30     Urobilinogen, POC 0.2 mg/dL     Nitrite, Urine, POC Negative     Leukocyte Esterase, Urine, POC Moderate

## 2025-04-09 ENCOUNTER — RESULTS FOLLOW-UP (OUTPATIENT)
Dept: PRIMARY CARE CLINIC | Facility: CLINIC | Age: 75
End: 2025-04-09

## 2025-04-10 ENCOUNTER — RESULTS FOLLOW-UP (OUTPATIENT)
Dept: PRIMARY CARE CLINIC | Facility: CLINIC | Age: 75
End: 2025-04-10

## 2025-04-10 LAB
BACTERIA SPEC CULT: ABNORMAL
BACTERIA SPEC CULT: ABNORMAL
SERVICE CMNT-IMP: ABNORMAL

## 2025-04-25 PROBLEM — Z00.00 MEDICARE ANNUAL WELLNESS VISIT, SUBSEQUENT: Status: RESOLVED | Noted: 2025-03-26 | Resolved: 2025-04-25

## 2025-07-08 ENCOUNTER — COMMUNITY OUTREACH (OUTPATIENT)
Dept: PRIMARY CARE CLINIC | Facility: CLINIC | Age: 75
End: 2025-07-08